# Patient Record
Sex: MALE | Race: WHITE
[De-identification: names, ages, dates, MRNs, and addresses within clinical notes are randomized per-mention and may not be internally consistent; named-entity substitution may affect disease eponyms.]

---

## 2020-03-24 ENCOUNTER — HOSPITAL ENCOUNTER (EMERGENCY)
Dept: HOSPITAL 25 - ED | Age: 30
LOS: 1 days | Discharge: HOME | End: 2020-03-25
Payer: COMMERCIAL

## 2020-03-24 DIAGNOSIS — R07.89: Primary | ICD-10-CM

## 2020-03-24 PROCEDURE — 99282 EMERGENCY DEPT VISIT SF MDM: CPT

## 2020-03-24 PROCEDURE — 71046 X-RAY EXAM CHEST 2 VIEWS: CPT

## 2020-03-24 PROCEDURE — 93005 ELECTROCARDIOGRAM TRACING: CPT

## 2020-03-24 PROCEDURE — 84484 ASSAY OF TROPONIN QUANT: CPT

## 2020-03-24 PROCEDURE — 86140 C-REACTIVE PROTEIN: CPT

## 2020-03-24 PROCEDURE — 85025 COMPLETE CBC W/AUTO DIFF WBC: CPT

## 2020-03-24 PROCEDURE — 85379 FIBRIN DEGRADATION QUANT: CPT

## 2020-03-24 PROCEDURE — 85610 PROTHROMBIN TIME: CPT

## 2020-03-24 PROCEDURE — 36415 COLL VENOUS BLD VENIPUNCTURE: CPT

## 2020-03-24 PROCEDURE — 80053 COMPREHEN METABOLIC PANEL: CPT

## 2020-03-25 VITALS — DIASTOLIC BLOOD PRESSURE: 78 MMHG | SYSTOLIC BLOOD PRESSURE: 126 MMHG

## 2020-03-25 LAB
ALBUMIN SERPL BCG-MCNC: 4.5 G/DL (ref 3.2–5.2)
ALBUMIN/GLOB SERPL: 1.9 {RATIO} (ref 1–3)
ALP SERPL-CCNC: 50 U/L (ref 34–104)
ALT SERPL W P-5'-P-CCNC: 38 U/L (ref 7–52)
ANION GAP SERPL CALC-SCNC: 7 MMOL/L (ref 2–11)
AST SERPL-CCNC: 25 U/L (ref 13–39)
BASOPHILS # BLD AUTO: 0.1 10^3/UL (ref 0–0.2)
BUN SERPL-MCNC: 17 MG/DL (ref 6–24)
BUN/CREAT SERPL: 17.2 (ref 8–20)
CALCIUM SERPL-MCNC: 9.6 MG/DL (ref 8.6–10.3)
CHLORIDE SERPL-SCNC: 104 MMOL/L (ref 101–111)
EOSINOPHIL # BLD AUTO: 0.7 10^3/UL (ref 0–0.6)
GLOBULIN SER CALC-MCNC: 2.4 G/DL (ref 2–4)
GLUCOSE SERPL-MCNC: 96 MG/DL (ref 70–100)
HCO3 SERPL-SCNC: 27 MMOL/L (ref 22–32)
HCT VFR BLD AUTO: 44 % (ref 42–52)
HGB BLD-MCNC: 16.1 G/DL (ref 14–18)
INR PPP/BLD: 1.07 (ref 0.82–1.09)
LYMPHOCYTES # BLD AUTO: 2.3 10^3/UL (ref 1–4.8)
MCH RBC QN AUTO: 31 PG (ref 27–31)
MCHC RBC AUTO-ENTMCNC: 36 G/DL (ref 31–36)
MCV RBC AUTO: 84 FL (ref 80–94)
MONOCYTES # BLD AUTO: 0.7 10^3/UL (ref 0–0.8)
NEUTROPHILS # BLD AUTO: 3.2 10^3/UL (ref 1.5–7.7)
NRBC # BLD AUTO: 0 10^3/UL
NRBC BLD QL AUTO: 0.2
PLATELET # BLD AUTO: 224 10^3/UL (ref 150–450)
POTASSIUM SERPL-SCNC: 3.9 MMOL/L (ref 3.5–5)
PROT SERPL-MCNC: 6.9 G/DL (ref 6.4–8.9)
RBC # BLD AUTO: 5.26 10^6 /UL (ref 4.18–5.48)
SODIUM SERPL-SCNC: 138 MMOL/L (ref 135–145)
TROPONIN I SERPL-MCNC: 0 NG/ML (ref ?–0.03)
WBC # BLD AUTO: 6.9 10^3/UL (ref 3.5–10.8)

## 2020-03-25 NOTE — ED
HPI Chest Pain





- HPI Summary


HPI Summary: 


30 year old male presents with chest pain for the past two weeks.  He states 

that it felt like a pressure and was being intermittent.  No correlation to 

activities.  He has been exercising and not having any pressure.  He states 

today he felt sharp pain in the center of his chest.  States it hurts more 

taking a deep breath.  Does not change with position changes.  He admits to 

shortness breath.  Has had a cough for the past 3 weeks.  Has had a dry cough.  

No fevers.  No sick contacts.  No known covid exposures.  No sore throat or 

sinus congestion.  No vomiting or nausea.  Hasn't tried anything for his 

symptoms.  He denies any travel.  He denies any swelling in his calf muscles. 

no palpitations. he is not currently having the pain. 





- History of Current Complaint


Chief Complaint: EDChestPainROMI


Time Seen by Provider: 03/25/20 00:01


Pain Intensity: 6





- Allergy/Home Medications


Allergies/Adverse Reactions: 


 Allergies











Allergy/AdvReac Type Severity Reaction Status Date / Time


 


No Known Allergies Allergy   Verified 03/24/20 23:51











Home Medications: 


 Home Medications





Mesalamine [Mesalamine ] 1.2 gm PO DAILY 03/25/20 [History Confirmed 03/25/20]











PMH/Surg Hx/FS Hx/Imm Hx


Endocrine/Hematology History: 


   Denies: Hx Anticoagulant Therapy


Respiratory History: 


   Denies: Hx Asthma


Infectious Disease History: No


Infectious Disease History: 


   Denies: Traveled Outside the [place] in Last 30 Days





- Family History


Known Family History: 


   Negative: Blood Disorder





- Social History


Alcohol Use: Occasionally


Substance Use Type: Reports: None


Smoking Status (MU): Never Smoked Tobacco





Review of Systems


Negative: Fever


Positive: Chest Pain


Positive: Shortness Of Breath, Cough


Negative: Abdominal Pain, Vomiting, Nausea


All Other Systems Reviewed And Are Negative: Yes





Physical Exam


Triage Information Reviewed: Yes


Vital Signs On Initial Exam: 


 Initial Vitals











Temp Pulse Resp BP Pulse Ox


 


 97.3 F   95   20   0/0   97 


 


 03/24/20 23:47  03/24/20 23:47  03/24/20 23:47  03/24/20 23:47  03/24/20 23:47











Vital Signs Reviewed: Yes


Appearance: Positive: Well-Appearing


Skin: Positive: Warm, Dry


Head/Face: Positive: Normal Head/Face Inspection


Eyes: Positive: Normal, Conjunctiva Clear


ENT: Positive: Pharynx normal


Respiratory/Lung Sounds: Positive: Clear to Auscultation, Breath Sounds Present


Cardiovascular: Positive: Normal, RRR


Abdomen Description: Positive: Nontender, Soft


Bowel Sounds: Positive: Present


Musculoskeletal: Positive: Normal


Neurological: Positive: Normal


Psychiatric: Positive: Normal





Procedures





- Sedation


Patient Received Moderate/Deep Sedation with Procedure: No





Diagnostics





- Vital Signs


 Vital Signs











  Temp Pulse Resp BP Pulse Ox


 


 03/24/20 23:47  97.3 F  95  20  0/0  97














- Laboratory


Result Diagrams: 


 03/25/20 00:23





 03/25/20 00:23


Lab Statement: Any lab studies that have been ordered have been reviewed, and 

results considered in the medical decision making process.





- Radiology


  ** chest


Radiology Interpretation Completed By: ED Physician


Summary of Radiographic Findings: no active disease





- EKG


  ** No standard instances


Cardiac Rate: NL


EKG Rhythm: Sinus Rhythm


Summary of EKG Findings: sinus rhythm, artifact present





Chest Pain Course/Dx





- Course


Course Of Treatment: 30 year old male presents with chest pain for the past two 

weeks.  He states that it felt like a pressure and was being intermittent.  No 

correlation to activities.  He has been exercising and not having any pressure.

  He states today he felt sharp pain in the center of his chest.  States it 

hurts more taking a deep breath.  Does not change with position changes.  He 

admits to shortness breath.  Has had a cough for the past 3 weeks.  Has had a 

dry cough.  No fevers.  No sick contacts.  No known covid exposures.  No sore 

throat or sinus congestion.  No vomiting or nausea.  Hasn't tried anything for 

his symptoms.  He denies any travel.  He denies any swelling in his calf 

muscles. no palpitations. on exam has reproducible chest pain. lungs CTA. no 

murmur. ekg sinus rhythm with artifact. wbc normal. d-dimer neg. troponin zero. 

crp normal. heart score 1. told to take ibuprofen as needed for pain. told 

follow up with primary. patient understand and agrees with plan.





- Chest Pain


Differential Diagnosis/HQI/PQRI: Angina, Chest Wall, Lower Respiratory Infection





- Diagnoses


Provider Diagnoses: 


 Atypical chest pain








Discharge ED





- Sign-Out/Discharge


Documenting (check all that apply): Patient Departure





- Discharge Plan


Condition: Good


Disposition: HOME


Patient Education Materials:  Noncardiac Chest Pain (ED)


Additional Instructions: 


follow up with primary


Take tyenlol or ibuprofen every 6 hours for pain


Return to ED if develop any new or worsening symptoms





- Billing Disposition and Condition


Condition: GOOD


Disposition: Home

## 2020-04-13 ENCOUNTER — HOSPITAL ENCOUNTER (EMERGENCY)
Dept: HOSPITAL 25 - ED | Age: 30
Discharge: HOME | End: 2020-04-13
Payer: COMMERCIAL

## 2020-04-13 VITALS — SYSTOLIC BLOOD PRESSURE: 136 MMHG | DIASTOLIC BLOOD PRESSURE: 94 MMHG

## 2020-04-13 DIAGNOSIS — Z79.899: ICD-10-CM

## 2020-04-13 DIAGNOSIS — R06.02: Primary | ICD-10-CM

## 2020-04-13 DIAGNOSIS — Z79.52: ICD-10-CM

## 2020-04-13 DIAGNOSIS — R05: ICD-10-CM

## 2020-04-13 DIAGNOSIS — R53.83: ICD-10-CM

## 2020-04-13 LAB
ALBUMIN SERPL BCG-MCNC: 4.6 G/DL (ref 3.2–5.2)
ALBUMIN/GLOB SERPL: 1.8 {RATIO} (ref 1–3)
ALP SERPL-CCNC: 42 U/L (ref 34–104)
ALT SERPL W P-5'-P-CCNC: 30 U/L (ref 7–52)
ANION GAP SERPL CALC-SCNC: 6 MMOL/L (ref 2–11)
AST SERPL-CCNC: 20 U/L (ref 13–39)
BASOPHILS # BLD AUTO: 0.1 10^3/UL (ref 0–0.2)
BUN SERPL-MCNC: 13 MG/DL (ref 6–24)
BUN/CREAT SERPL: 14.4 (ref 8–20)
CALCIUM SERPL-MCNC: 10 MG/DL (ref 8.6–10.3)
CHLORIDE SERPL-SCNC: 105 MMOL/L (ref 101–111)
EOSINOPHIL # BLD AUTO: 0.4 10^3/UL (ref 0–0.6)
GLOBULIN SER CALC-MCNC: 2.5 G/DL (ref 2–4)
GLUCOSE SERPL-MCNC: 76 MG/DL (ref 70–100)
HCO3 SERPL-SCNC: 29 MMOL/L (ref 22–32)
HCT VFR BLD AUTO: 46 % (ref 42–52)
HGB BLD-MCNC: 16.9 G/DL (ref 14–18)
LYMPHOCYTES # BLD AUTO: 1.9 10^3/UL (ref 1–4.8)
MCH RBC QN AUTO: 30 PG (ref 27–31)
MCHC RBC AUTO-ENTMCNC: 37 G/DL (ref 31–36)
MCV RBC AUTO: 82 FL (ref 80–94)
MONOCYTES # BLD AUTO: 0.6 10^3/UL (ref 0–0.8)
NEUTROPHILS # BLD AUTO: 3.3 10^3/UL (ref 1.5–7.7)
NRBC # BLD AUTO: 0 10^3/UL
NRBC BLD QL AUTO: 0.2
PLATELET # BLD AUTO: 240 10^3/UL (ref 150–450)
POTASSIUM SERPL-SCNC: 4.4 MMOL/L (ref 3.5–5)
PROT SERPL-MCNC: 7.1 G/DL (ref 6.4–8.9)
RBC # BLD AUTO: 5.64 10^6 /UL (ref 4.18–5.48)
SODIUM SERPL-SCNC: 140 MMOL/L (ref 135–145)
TROPONIN I SERPL-MCNC: 0 NG/ML (ref ?–0.03)
WBC # BLD AUTO: 6.2 10^3/UL (ref 3.5–10.8)

## 2020-04-13 PROCEDURE — 99284 EMERGENCY DEPT VISIT MOD MDM: CPT

## 2020-04-13 PROCEDURE — 84484 ASSAY OF TROPONIN QUANT: CPT

## 2020-04-13 PROCEDURE — G2023 SPECIMEN COLLECT COVID-19: HCPCS

## 2020-04-13 PROCEDURE — 71045 X-RAY EXAM CHEST 1 VIEW: CPT

## 2020-04-13 PROCEDURE — 85379 FIBRIN DEGRADATION QUANT: CPT

## 2020-04-13 PROCEDURE — 93005 ELECTROCARDIOGRAM TRACING: CPT

## 2020-04-13 PROCEDURE — 87635 SARS-COV-2 COVID-19 AMP PRB: CPT

## 2020-04-13 PROCEDURE — 80053 COMPREHEN METABOLIC PANEL: CPT

## 2020-04-13 PROCEDURE — 86140 C-REACTIVE PROTEIN: CPT

## 2020-04-13 PROCEDURE — 36415 COLL VENOUS BLD VENIPUNCTURE: CPT

## 2020-04-13 PROCEDURE — 85025 COMPLETE CBC W/AUTO DIFF WBC: CPT

## 2020-04-13 NOTE — ED
Respiratory





- HPI Summary


HPI Summary: 


31 y/o M with hx ulcerative colitis for which he is on mesalamine [facility] c/o shortness of breath, fatigue, cough for 

over 1 month. He states that every day for couple hours, he feels as if he 

needs to concentrate on taking a deep breath. His shortness of breath is 

usually worse after exercise. He feel as if he is in a fog. He is fatigued. He 

has a cough. No fever, sore throat, n/v/d. Came to the ED 3/20 and had negative 

workup. Was tested negative for COVID19 4/7. He has tried taking hydroxyzine 

for suspected anxiety with no relief and as well as antibiotics with no relief. 

Called his primary care provider today about his symptoms and was referred to 

the ER to be evaluated. Medications reviewed. No known drug allergies. Hx 

multiple leg surgeries. No FHx cardiac or respiratory disease. Occasional ETOH.





- History of Current Complaint


Stated Complaint: COUGH,SOB PER PT


Time Seen by Provider: 04/13/20 11:26


Hx Obtained From: Patient


Onset/Duration: Lasting Weeks, Still Present


Timing: Constant


Aggravating Factor(s): Exertion


Alleviating Factor(s): Nothing





- Allergy/Home Medications


Allergies/Adverse Reactions: 


 Allergies











Allergy/AdvReac Type Severity Reaction Status Date / Time


 


No Known Allergies Allergy   Verified 03/24/20 23:51











Home Medications: 


 Home Medications





Albuterol HFA INHALER* [Ventolin HFA Inhaler*] 1 - 2 puff INH Q4H PRN #1 mdi 04/ 13/20 [Rx]


Mesalamine (NF) [Apriso (NF)] 0.375 gm PO TID 04/13/20 [History Confirmed 04/13/ 20]


Mesalamine SUPP (NF) [Canasa SUPP (NF)] 1,000 mg ID BEDTIME 04/13/20 [History 

Confirmed 04/13/20]


predniSONE 20 mg TAB [Deltasone 20 MG TAB*] 40 mg PO DAILY 4 Days #8 tab 04/13/ 20 [Rx]











PMH/Surg Hx/FS Hx/Imm Hx


Endocrine/Hematology History: 


   Denies: Hx Anticoagulant Therapy


Respiratory History: 


   Denies: Hx Asthma


GI History: Reports: Other GI Disorders - ulcerative colitis for which he is on 

mesalamine





- Surgical History


Surgical History: Yes


Surgery Procedure, Year, and Place: multiple leg surgeries


Infectious Disease History: 


   Denies: Traveled Outside the [place] in Last 30 Days





- Family History


Known Family History: 


   Negative: Cardiac Disease, Respiratory Disease





- Social History


Alcohol Use: Occasionally


Substance Use Type: Reports: None


Hx Tobacco Use: No


Smoking Status (MU): Never Smoked Tobacco





Review of Systems


Positive: Fatigue.  Negative: Fever


Positive: Shortness Of Breath, Cough


Negative: Vomiting, Diarrhea, Nausea


All Other Systems Reviewed And Are Negative: Yes





Physical Exam





- Summary


Physical Exam Summary: 





Constitutional: Well-developed, Well-nourished, Alert. (-) Distressed


Skin: Warm, Dry


HENT: Normocephalic; Atraumatic


Eyes: Conjunctiva normal


Neck: Musculoskeletal ROM normal neck. (-) JVD, (-) Stridor, (-) Tracheal 

deviation


Cardio: Rhythm regular, rate normal, Heart sounds normal; Intact distal pulses; 

Radial pulses are 2+ and symmetric. (-) Murmur


Pulmonary/Chest wall: Effort normal, he is satting 99% on room air, unlabored 

breathing; (-) Respiratory distress, (-) Wheezes, (-) Rales


Abd: Soft, (-) tenderness, (-) Distension, (-) Guarding, (-) Rebound


Musculoskeletal: (-) Edema


Lymph: (-) Cervical adenopathy


Neuro: Alert, Oriented x3


Psych: Mood and affect Normal





Triage Information Reviewed: Yes


Vital Signs Reviewed: Yes





Procedures





- Sedation


Patient Received Moderate/Deep Sedation with Procedure: No





Diagnostics





- Laboratory


Result Diagrams: 


 04/13/20 12:15





 04/13/20 12:15


Lab Statement: Any lab studies that have been ordered have been reviewed, and 

results considered in the medical decision making process.





- Radiology


  ** CXR


Radiology Interpretation Completed By: Radiologist - IMPRESSION: #.  Elevated 

lung volumes may reflect obstructive lung disease or simply exuberant 

inspiratory effort for examination.  #. No radiographic evidence for pneumonia. 

ED physician has reviewed this report.





- EKG


  ** 1201


Cardiac Rate: NL - 74 BPM


EKG Rhythm: Sinus Rhythm


Summary of EKG Findings: No obvious ischemic changes. ED physician has reviewed 

and interpreted this EKG.





Disposition





- Course


Course Of Treatment: Patient is here with 1 month of shortness of breath and 

chest pressure.  Patient states his symptoms are worsened by exercise.  Patient 

was evaluated here on 3/20/20 with a negative workup.  Patient has tried 

hydroxyzine for possible anxiety without effect.  Patient tried a course of 

antibiotics as well without relief.  Patient had negative Covid 19 swab.  

Patient is overall well-appearing upon arrival with normal vital signs.  

Patient had blood performed which showed no evidence of PE, elevated troponin, 

anemia, kidney disease.  Patient had a chest x-ray showed no abnormality.  

Patient was retested for Covid 19 given the sensitivity of the swab.  Patient 

is likely suffering from allergies/asthma given his current status of his 

symptoms and getting worse with exercise.  Patient started on a five-day course 

of prednisone and albuterol when necessary.  Patient was encouraged to start a 

daily allergy medicine as well.





- Diagnoses


Provider Diagnoses: 


 Shortness of breath, Fatigue








Discharge ED





- Sign-Out/Discharge


Documenting (check all that apply): Patient Departure





- Discharge Plan


Condition: Stable


Disposition: HOME


Prescriptions: 


Albuterol HFA INHALER* [Ventolin HFA Inhaler*] 1 - 2 puff INH Q4H PRN #1 mdi


 PRN Reason: Shortness Of Breath


predniSONE 20 mg TAB [Deltasone 20 MG TAB*] 40 mg PO DAILY 4 Days #8 tab


Patient Education Materials:  Shortness of Breath (ED)


Forms:  COVID-19 Tested & Isolation


Referrals: 


[facility] [Outside]


Additional Instructions: 


Take your steroids as prescribed and use your Albuterol inhaler as prescribed. 

Start taking a daily allergy medication like Zyrtec or Claritin. Please follow 

up with your primary care physician in 1-3 days.


You were seen in the emergency department for coronavirus rule out. The 

department of health will contact you within 24 hours. Due to the pandemic, you 

should stay in your house and self quarantine. See the separate quarantine 

paper for further instructions. You should wear a mask if you're outside of 

your personal room.  We encourage handwashing as well as limited contact with 

other people including the elderly and the immunocompromised.


If any studies were not completed at the time of discharge, you will be called 

with the relevant results.


Return to emergency department for severe trouble breathing, worsening or 

concerning symptoms.


It was a pleasure taking care of you today.





- Billing Disposition and Condition


Condition: STABLE


Disposition: Home





- Attestation Statements


Document Initiated by Scribe: Yes


Documenting Scribe: [place]


Provider For Whom Rosalind is Documenting (Include Credential): Sukumar Flaherty MD


Scribe Attestation: 


I, [place], scribed for Sukumar Flaherty MD on 04/13/20 at 1753. 


Scribe Documentation Reviewed: Yes


Provider Attestation: 


The documentation as recorded by the scribe, [place] accurately reflects 

the service I personally performed and the decisions made by me, Sukumar Flaherty MD


Status of Scribe Document: Viewed